# Patient Record
Sex: FEMALE | Race: OTHER | NOT HISPANIC OR LATINO | URBAN - METROPOLITAN AREA
[De-identification: names, ages, dates, MRNs, and addresses within clinical notes are randomized per-mention and may not be internally consistent; named-entity substitution may affect disease eponyms.]

---

## 2024-01-16 ENCOUNTER — OFFICE VISIT (OUTPATIENT)
Dept: DENTISTRY | Facility: CLINIC | Age: 19
End: 2024-01-16

## 2024-01-16 VITALS — TEMPERATURE: 99 F | SYSTOLIC BLOOD PRESSURE: 128 MMHG | HEART RATE: 76 BPM | DIASTOLIC BLOOD PRESSURE: 79 MMHG

## 2024-01-16 DIAGNOSIS — K08.89 PAIN, DENTAL: Primary | ICD-10-CM

## 2024-01-16 PROCEDURE — D0220 INTRAORAL - PERIAPICAL FIRST RADIOGRAPHIC IMAGE: HCPCS | Performed by: DENTAL HYGIENIST

## 2024-01-16 PROCEDURE — D0230 INTRAORAL - PERIAPICAL EACH ADDITIONAL RADIOGRAPHIC IMAGE: HCPCS | Performed by: DENTAL HYGIENIST

## 2024-01-16 PROCEDURE — D0330 PANORAMIC RADIOGRAPHIC IMAGE: HCPCS | Performed by: DENTAL HYGIENIST

## 2024-01-16 PROCEDURE — D0140 LIMITED ORAL EVALUATION - PROBLEM FOCUSED: HCPCS

## 2024-01-16 NOTE — DENTAL PROCEDURE DETAILS
Subjective   Patient ID: Jamaica Mckeon is a 18 y.o. female.  Chief Complaint   Patient presents with    New Patient Visit    Emergency/limited Exam     Reviewed medical history   ASA  I  Pain - 7-8  I-PAD and pt's phone [employer] translation -  #  [id number] - 30 min    CC:  Pain in 3rd molars and also UR, LR, UL, and LL molars    Today:  Limited exam, 3 PA's - 15, 18, 30, and PAN    DX:  #17 - impacted and #18 - large caries - both bother her the most  ---#15 - large caries - needs restoration  ---#3 - decayed and broken to root - needs ext  ---Recommend ext's of 1, 16, and 32 also.  ---Recommend returning for Comprehensive exam and BW's    Exam:  Dr. Julien  Referral:  OS - for 1, 3, 16, 17, 18, 32    NV1:  Comp, BW's, FMP - 50 min

## 2024-06-17 ENCOUNTER — OFFICE VISIT (OUTPATIENT)
Dept: DENTISTRY | Facility: CLINIC | Age: 19
End: 2024-06-17

## 2024-06-17 VITALS — HEART RATE: 70 BPM | TEMPERATURE: 97.7 F | DIASTOLIC BLOOD PRESSURE: 80 MMHG | SYSTOLIC BLOOD PRESSURE: 114 MMHG

## 2024-06-17 DIAGNOSIS — K08.89 PAIN, DENTAL: Primary | ICD-10-CM

## 2024-06-17 DIAGNOSIS — K08.9 EXTRACTION OF TOOTH NEEDED: ICD-10-CM

## 2024-06-17 PROCEDURE — D0140 LIMITED ORAL EVALUATION - PROBLEM FOCUSED: HCPCS

## 2024-06-17 PROCEDURE — D0220 INTRAORAL - PERIAPICAL FIRST RADIOGRAPHIC IMAGE: HCPCS | Performed by: DENTAL HYGIENIST

## 2024-06-17 PROCEDURE — D0230 INTRAORAL - PERIAPICAL EACH ADDITIONAL RADIOGRAPHIC IMAGE: HCPCS | Performed by: DENTAL HYGIENIST

## 2024-06-17 RX ORDER — ACETAMINOPHEN 500 MG
500 TABLET ORAL EVERY 6 HOURS PRN
Qty: 28 TABLET | Refills: 0 | Status: SHIPPED | OUTPATIENT
Start: 2024-06-17 | End: 2024-06-24

## 2024-06-17 RX ORDER — IBUPROFEN 600 MG/1
600 TABLET ORAL EVERY 6 HOURS PRN
Qty: 28 TABLET | Refills: 0 | Status: SHIPPED | OUTPATIENT
Start: 2024-06-17 | End: 2024-06-24

## 2024-06-17 NOTE — DENTAL PROCEDURE DETAILS
Subjective   Patient ID: Jamaica Mckeon is a 18 y.o. female.  Chief Complaint   Patient presents with    Emergency/limited Exam     Reviewed medical history   ASA  I  Pain - 10  I-PAD for Portugese translation -  #[id number] - 15 min    CC:  Teeth broke UR and LL.  Lots of pain.    Today:  Limited exam, 2 PA's 3 and 18.  Previous PAN from 1/ 2024    DX:  Teeth 3 and 18 severely decayed and broken down.  Referred to OS for ext's  ---Sent RX for pain med online to pt's pharmacy  ---Referred to OS for 1, 3,17, 18, 32 with high priority on 3 and 18.    Exam:  Dr. Julien  Referral:  OS    NV1:  Comp, BW4, FMP - 50 min    Please give OS referral and xrays